# Patient Record
(demographics unavailable — no encounter records)

---

## 2024-11-01 NOTE — HISTORY OF PRESENT ILLNESS
[de-identified] :  11/1/24: 29M  RHD present for right hand pain since 10/31/24. 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 Patient reports she was on a stretcher and his hand was bearing heavy weight thru doors in a very tight space. Denies UC. Pain scale 6/10. He states he took Advil last night.

## 2024-11-01 NOTE — ASSESSMENT
[FreeTextEntry1] : EXAM Right hand with no swelling or erythema. Able to make fist, oppose thumb to small finger with good thenar bulk. No intrinsic atrophy. Tender along distal palmar crease, most notable at index finger. Sensation intact throughout with <2sec cap refill.  Right hand radiographs with no fracture nor dislocation, (3-view)   ASSESSMENT & PLAN Right index finger tenosynovitis - Discussed with patient the pathoanatomy of trigger and options going forward. Risks/benefits discussed as well as natural progression that injection will provide some relief but symptoms may recur. Discussed that pain may worsen in coming days but will subside. Discussed that we can repeat an injection or that operative intervention may be indicated down the line   F/u 3weeks; consider OT